# Patient Record
Sex: MALE | Race: WHITE | URBAN - METROPOLITAN AREA
[De-identification: names, ages, dates, MRNs, and addresses within clinical notes are randomized per-mention and may not be internally consistent; named-entity substitution may affect disease eponyms.]

---

## 2023-01-01 ENCOUNTER — LAB REQUISITION (OUTPATIENT)
Dept: LAB | Facility: CLINIC | Age: 88
End: 2023-01-01
Payer: COMMERCIAL

## 2023-01-01 ENCOUNTER — TRANSITIONAL CARE UNIT VISIT (OUTPATIENT)
Dept: GERIATRICS | Facility: CLINIC | Age: 88
End: 2023-01-01
Payer: COMMERCIAL

## 2023-01-01 ENCOUNTER — TELEPHONE (OUTPATIENT)
Dept: GERIATRICS | Facility: CLINIC | Age: 88
End: 2023-01-01

## 2023-01-01 ENCOUNTER — DISCHARGE SUMMARY NURSING HOME (OUTPATIENT)
Dept: GERIATRICS | Facility: CLINIC | Age: 88
End: 2023-01-01
Payer: COMMERCIAL

## 2023-01-01 ENCOUNTER — DOCUMENTATION ONLY (OUTPATIENT)
Dept: GERIATRICS | Facility: CLINIC | Age: 88
End: 2023-01-01

## 2023-01-01 VITALS
HEIGHT: 70 IN | OXYGEN SATURATION: 98 % | RESPIRATION RATE: 17 BRPM | TEMPERATURE: 98.3 F | BODY MASS INDEX: 17.67 KG/M2 | WEIGHT: 123.4 LBS | DIASTOLIC BLOOD PRESSURE: 59 MMHG | HEART RATE: 61 BPM | SYSTOLIC BLOOD PRESSURE: 99 MMHG

## 2023-01-01 VITALS
BODY MASS INDEX: 17.67 KG/M2 | OXYGEN SATURATION: 98 % | HEART RATE: 85 BPM | WEIGHT: 123.4 LBS | HEIGHT: 70 IN | DIASTOLIC BLOOD PRESSURE: 57 MMHG | SYSTOLIC BLOOD PRESSURE: 109 MMHG | TEMPERATURE: 98.2 F | RESPIRATION RATE: 18 BRPM

## 2023-01-01 VITALS
WEIGHT: 120.8 LBS | BODY MASS INDEX: 17.29 KG/M2 | DIASTOLIC BLOOD PRESSURE: 67 MMHG | HEART RATE: 88 BPM | TEMPERATURE: 98.1 F | HEIGHT: 70 IN | OXYGEN SATURATION: 94 % | SYSTOLIC BLOOD PRESSURE: 138 MMHG | RESPIRATION RATE: 18 BRPM

## 2023-01-01 VITALS
HEIGHT: 70 IN | SYSTOLIC BLOOD PRESSURE: 123 MMHG | BODY MASS INDEX: 18.44 KG/M2 | TEMPERATURE: 98 F | DIASTOLIC BLOOD PRESSURE: 61 MMHG | OXYGEN SATURATION: 96 % | RESPIRATION RATE: 16 BRPM | WEIGHT: 128.8 LBS | HEART RATE: 85 BPM

## 2023-01-01 DIAGNOSIS — R52 PAIN: ICD-10-CM

## 2023-01-01 DIAGNOSIS — D62 ABLA (ACUTE BLOOD LOSS ANEMIA): ICD-10-CM

## 2023-01-01 DIAGNOSIS — S06.5XAA SDH (SUBDURAL HEMATOMA) (H): Primary | ICD-10-CM

## 2023-01-01 DIAGNOSIS — S70.02XS HEMATOMA OF LEFT HIP, SEQUELA: ICD-10-CM

## 2023-01-01 DIAGNOSIS — N18.31 STAGE 3A CHRONIC KIDNEY DISEASE (H): ICD-10-CM

## 2023-01-01 DIAGNOSIS — R52 PAIN: Primary | ICD-10-CM

## 2023-01-01 DIAGNOSIS — I48.92 PAROXYSMAL ATRIAL FLUTTER (H): ICD-10-CM

## 2023-01-01 DIAGNOSIS — J44.9 COPD MIXED TYPE (H): ICD-10-CM

## 2023-01-01 DIAGNOSIS — N17.9 AKI (ACUTE KIDNEY INJURY) (H): ICD-10-CM

## 2023-01-01 DIAGNOSIS — I73.9 PAD (PERIPHERAL ARTERY DISEASE) (H): ICD-10-CM

## 2023-01-01 DIAGNOSIS — R91.8 LUNG MASS: ICD-10-CM

## 2023-01-01 DIAGNOSIS — S70.02XD CONTUSION OF LEFT HIP, SUBSEQUENT ENCOUNTER: ICD-10-CM

## 2023-01-01 DIAGNOSIS — S22.31XD FRACTURE OF ONE RIB, RIGHT SIDE, SUBSEQUENT ENCOUNTER FOR FRACTURE WITH ROUTINE HEALING: ICD-10-CM

## 2023-01-01 DIAGNOSIS — S06.5XAD TRAUMATIC SUBDURAL HEMORRHAGE WITH LOSS OF CONSCIOUSNESS STATUS UNKNOWN, SUBSEQUENT ENCOUNTER: ICD-10-CM

## 2023-01-01 DIAGNOSIS — S22.31XS CLOSED FRACTURE OF ONE RIB OF RIGHT SIDE, SEQUELA: ICD-10-CM

## 2023-01-01 LAB
ANION GAP SERPL CALCULATED.3IONS-SCNC: 10 MMOL/L (ref 7–15)
ANION GAP SERPL CALCULATED.3IONS-SCNC: 10 MMOL/L (ref 7–15)
BUN SERPL-MCNC: 11.8 MG/DL (ref 8–23)
BUN SERPL-MCNC: 17.5 MG/DL (ref 8–23)
CALCIUM SERPL-MCNC: 8.5 MG/DL (ref 8.2–9.6)
CALCIUM SERPL-MCNC: 8.5 MG/DL (ref 8.2–9.6)
CHLORIDE SERPL-SCNC: 105 MMOL/L (ref 98–107)
CHLORIDE SERPL-SCNC: 108 MMOL/L (ref 98–107)
CREAT SERPL-MCNC: 1.09 MG/DL (ref 0.67–1.17)
CREAT SERPL-MCNC: 1.13 MG/DL (ref 0.67–1.17)
DEPRECATED HCO3 PLAS-SCNC: 21 MMOL/L (ref 22–29)
DEPRECATED HCO3 PLAS-SCNC: 22 MMOL/L (ref 22–29)
EGFRCR SERPLBLD CKD-EPI 2021: 59 ML/MIN/1.73M2
EGFRCR SERPLBLD CKD-EPI 2021: 62 ML/MIN/1.73M2
ERYTHROCYTE [DISTWIDTH] IN BLOOD BY AUTOMATED COUNT: 23.1 % (ref 10–15)
GLUCOSE SERPL-MCNC: 71 MG/DL (ref 70–99)
GLUCOSE SERPL-MCNC: 75 MG/DL (ref 70–99)
HCT VFR BLD AUTO: 27.1 % (ref 40–53)
HGB BLD-MCNC: 8.7 G/DL (ref 13.3–17.7)
HGB BLD-MCNC: 8.8 G/DL (ref 13.3–17.7)
HGB BLD-MCNC: 9.8 G/DL (ref 13.3–17.7)
MCH RBC QN AUTO: 31.8 PG (ref 26.5–33)
MCHC RBC AUTO-ENTMCNC: 32.5 G/DL (ref 31.5–36.5)
MCV RBC AUTO: 98 FL (ref 78–100)
PLATELET # BLD AUTO: 289 10E3/UL (ref 150–450)
POTASSIUM SERPL-SCNC: 4 MMOL/L (ref 3.4–5.3)
POTASSIUM SERPL-SCNC: 4.4 MMOL/L (ref 3.4–5.3)
RBC # BLD AUTO: 2.77 10E6/UL (ref 4.4–5.9)
SODIUM SERPL-SCNC: 136 MMOL/L (ref 135–145)
SODIUM SERPL-SCNC: 140 MMOL/L (ref 135–145)
WBC # BLD AUTO: 6.6 10E3/UL (ref 4–11)

## 2023-01-01 PROCEDURE — P9604 ONE-WAY ALLOW PRORATED TRIP: HCPCS | Mod: ORL

## 2023-01-01 PROCEDURE — P9604 ONE-WAY ALLOW PRORATED TRIP: HCPCS | Mod: ORL | Performed by: FAMILY MEDICINE

## 2023-01-01 PROCEDURE — 99309 SBSQ NF CARE MODERATE MDM 30: CPT | Performed by: NURSE PRACTITIONER

## 2023-01-01 PROCEDURE — 80048 BASIC METABOLIC PNL TOTAL CA: CPT | Mod: ORL

## 2023-01-01 PROCEDURE — 36415 COLL VENOUS BLD VENIPUNCTURE: CPT | Mod: ORL | Performed by: FAMILY MEDICINE

## 2023-01-01 PROCEDURE — P9603 ONE-WAY ALLOW PRORATED MILES: HCPCS | Mod: ORL | Performed by: FAMILY MEDICINE

## 2023-01-01 PROCEDURE — 36415 COLL VENOUS BLD VENIPUNCTURE: CPT | Mod: ORL

## 2023-01-01 PROCEDURE — 85018 HEMOGLOBIN: CPT | Mod: ORL | Performed by: FAMILY MEDICINE

## 2023-01-01 PROCEDURE — 85027 COMPLETE CBC AUTOMATED: CPT | Mod: ORL

## 2023-01-01 PROCEDURE — 99315 NF DSCHRG MGMT 30 MIN/LESS: CPT | Performed by: NURSE PRACTITIONER

## 2023-01-01 PROCEDURE — 80048 BASIC METABOLIC PNL TOTAL CA: CPT | Mod: ORL | Performed by: FAMILY MEDICINE

## 2023-01-01 RX ORDER — HYDROMORPHONE HYDROCHLORIDE 2 MG/1
0.5 TABLET ORAL EVERY 6 HOURS PRN
COMMUNITY
End: 2023-01-01

## 2023-01-01 RX ORDER — METOPROLOL TARTRATE 25 MG/1
6.25 TABLET, FILM COATED ORAL 2 TIMES DAILY
COMMUNITY

## 2023-01-01 RX ORDER — LIDOCAINE 40 MG/G
CREAM TOPICAL 2 TIMES DAILY
COMMUNITY

## 2023-01-01 RX ORDER — SENNOSIDES 8.6 MG
1 TABLET ORAL 2 TIMES DAILY PRN
COMMUNITY

## 2023-01-01 RX ORDER — POLYETHYLENE GLYCOL 3350 17 G/17G
17 POWDER, FOR SOLUTION ORAL DAILY
COMMUNITY

## 2023-01-01 RX ORDER — ACETAMINOPHEN 500 MG
1000 TABLET ORAL 3 TIMES DAILY
COMMUNITY

## 2023-01-01 RX ORDER — HYDROMORPHONE HYDROCHLORIDE 2 MG/1
1 TABLET ORAL EVERY 6 HOURS PRN
Qty: 30 TABLET | Refills: 0 | Status: SHIPPED | OUTPATIENT
Start: 2023-01-01 | End: 2023-01-01

## 2023-11-03 NOTE — TELEPHONE ENCOUNTER
[facility] Geriatrics Lab Note     Provider: Lobito Jama NP   Facility: [facility]  Facility Type:  TCU    Not on File    Labs Reviewed by provider: BMP, Hgb     Verbal Order/Direction given by Provider: Check Hgb on 11/6/23.      Provider giving Order:  SONAL Saini, COURT    Verbal Order given to: Fran([phone])    Rodriguez Ontiveros RN

## 2023-11-05 PROBLEM — D68.9 COAGULOPATHY (H): Status: ACTIVE | Noted: 2023-01-01

## 2023-11-05 PROBLEM — S70.02XA HIP HEMATOMA, LEFT: Status: ACTIVE | Noted: 2023-01-01

## 2023-11-05 PROBLEM — R06.09 DYSPNEA ON EXERTION: Status: ACTIVE | Noted: 2021-01-19

## 2023-11-05 PROBLEM — I48.92 PAROXYSMAL ATRIAL FLUTTER (H): Status: ACTIVE | Noted: 2023-01-01

## 2023-11-05 PROBLEM — K92.1 MELENA: Status: ACTIVE | Noted: 2022-05-18

## 2023-11-05 PROBLEM — R91.8 LUNG MASS: Status: ACTIVE | Noted: 2023-01-01
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
## 2023-11-05 PROBLEM — F10.20 ALCOHOLISM (H): Status: ACTIVE | Noted: 2021-01-28

## 2023-11-05 PROBLEM — S06.5XAA SDH (SUBDURAL HEMATOMA) (H): Status: ACTIVE | Noted: 2023-01-01

## 2023-11-05 PROBLEM — Z79.01 ANTICOAGULATION MONITORING, INR RANGE 2-3: Status: ACTIVE | Noted: 2021-01-27

## 2023-11-05 PROBLEM — H35.372 EPIRETINAL MEMBRANE (ERM) OF LEFT EYE: Chronic | Status: ACTIVE | Noted: 2017-07-05

## 2023-11-05 PROBLEM — Z99.81 SUPPLEMENTAL OXYGEN DEPENDENT: Status: ACTIVE | Noted: 2021-02-10

## 2023-11-05 PROBLEM — J44.9 COPD MIXED TYPE (H): Status: ACTIVE | Noted: 2021-02-24

## 2023-11-05 PROBLEM — I73.9 PAD (PERIPHERAL ARTERY DISEASE) (H): Status: ACTIVE | Noted: 2019-05-28

## 2023-11-05 PROBLEM — C61 MALIGNANT NEOPLASM OF PROSTATE (H): Status: ACTIVE | Noted: 2021-01-28

## 2023-11-05 PROBLEM — J84.112 USUAL INTERSTITIAL PNEUMONITIS (H): Status: ACTIVE | Noted: 2021-03-17

## 2023-11-05 PROBLEM — Z87.891 HISTORY OF TOBACCO USE: Status: ACTIVE | Noted: 2021-02-03

## 2023-11-05 PROBLEM — R09.02 HYPOXIA: Status: ACTIVE | Noted: 2021-02-03

## 2023-11-05 PROBLEM — D62 ACUTE BLOOD LOSS ANEMIA: Status: ACTIVE | Noted: 2023-01-01

## 2023-11-05 PROBLEM — E87.5 HYPERKALEMIA: Status: ACTIVE | Noted: 2023-01-01

## 2023-11-05 PROBLEM — N17.9 AKI (ACUTE KIDNEY INJURY) (H): Status: ACTIVE | Noted: 2023-01-01

## 2023-11-05 PROBLEM — D72.819 CHRONIC LEUKOPENIA: Status: ACTIVE | Noted: 2021-01-24

## 2023-11-05 PROBLEM — J96.11 CHRONIC RESPIRATORY FAILURE WITH HYPOXIA (H): Status: ACTIVE | Noted: 2022-05-18

## 2023-11-05 PROBLEM — N18.30 CKD (CHRONIC KIDNEY DISEASE) STAGE 3, GFR 30-59 ML/MIN (H): Status: ACTIVE | Noted: 2020-02-03

## 2023-11-05 PROBLEM — I48.92 ATRIAL FLUTTER, CHRONIC (H): Status: ACTIVE | Noted: 2021-01-27

## 2023-11-05 PROBLEM — R42 DIZZINESS: Status: ACTIVE | Noted: 2019-07-29

## 2023-11-05 PROBLEM — Z95.0 CARDIAC PACEMAKER IN SITU: Status: ACTIVE | Noted: 2022-05-18

## 2023-11-05 PROBLEM — S22.31XA FRACTURE OF ONE RIB OF RIGHT SIDE: Status: ACTIVE | Noted: 2022-10-21

## 2023-11-05 PROBLEM — I49.5 TACHY-BRADY SYNDROME (H): Status: ACTIVE | Noted: 2023-01-01

## 2023-11-05 PROBLEM — S22.080A T12 COMPRESSION FRACTURE (H): Status: ACTIVE | Noted: 2022-10-21

## 2023-11-05 NOTE — PROGRESS NOTES
[facility] GERIATRICS    PRIMARY CARE PROVIDER AND CLINIC:  No primary care provider on file., No primary physician on file.  Chief Complaint   Patient presents with    ELIEECK      [facility] Medical Record Number:  [id number]  Place of Service where encounter took place:  [facility] ([facility]) [[id number]]    Graeme Ogden  is a 89 year old  (12/14/1925), admitted to the above facility from  [facility]. Hospital stay 10/27/23 through 11/1/23..     HPI:    This is a 89 yo male with PMHx for PAF on coumadin, PPM, CKF stage 3a, COPD, Htn who presented after mechanical fall and found to have INR >20 with SDH. Of note lives in senior living and has been falling more.  Hypercoagulability reversed with vitamin K and Kcentra, neurosurgery consulted, no indication for intervention, recommending holding anticoagulation for at least 1 month preferably indefinitely, will follow-up with cardiologist.  Did have episode of A-fib with RVR, was asymptomatic started on low-dose Lopressor for rate control.  Was given Tylenol and Dilaudid for pain control as he had significant hematoma on the left side of chest.  No other changes noted, discharged to [facility] for rehab.    CODE STATUS/ADVANCE DIRECTIVES DISCUSSION:  No Order  DNR / DNI  ALLERGIES:   Allergies   Allergen Reactions    Mirtazapine Other (See Comments)      PAST MEDICAL HISTORY: No past medical history on file.   PAST SURGICAL HISTORY:   has no past surgical history on file.  FAMILY HISTORY: family history is not on file.  SOCIAL HISTORY:     Patient's living condition: lives with spouse    Post Discharge Medication Reconciliation Status:   MED REC REQUIRED  Post Medication Reconciliation Status:  Discharge medications reconciled and changed, see notes/orders       Current Outpatient Medications   Medication Sig    acetaminophen (TYLENOL) 500 MG tablet Take 1,000 mg by mouth 4 times daily    lidocaine (LMX4) 4 % external cream Apply topically 2  "times daily    metoprolol tartrate (LOPRESSOR) 25 MG tablet Take 6.25 mg by mouth 2 times daily    polyethylene glycol (MIRALAX) 17 GM/Dose powder Take 17 g by mouth daily    sennosides (SENOKOT) 8.6 MG tablet Take 1 tablet by mouth 2 times daily as needed for constipation     No current facility-administered medications for this visit.       ROS:  10 point ROS of systems including Constitutional, Eyes, Respiratory, Cardiovascular, Gastroenterology, Genitourinary, Integumentary, Musculoskeletal, Psychiatric were all negative except for pertinent positives noted in my HPI.    Vitals:  /61   Pulse 85   Temp 98  F (36.7  C)   Resp 16   Ht 1.778 m (5' 10\")   Wt 58.4 kg (128 lb 12.8 oz)   SpO2 96%   BMI 18.48 kg/m    Exam:  GENERAL APPEARANCE:  Alert, in no distress, thin, cooperative, L side of chest pain  ENT:  Mouth and posterior oropharynx normal, moist mucous membranes, normal hearing acuity  NECK:  No adenopathy,masses or thyromegaly  RESP:  lungs clear to auscultation , no respiratory distress  CV:  no edema, irregular rhythm per rate controlled PAF  ABDOMEN:  normal bowel sounds, soft, nontender, no hepatosplenomegaly or other masses, has constipation  M/S:   Able to move all extremities  SKIN:  significant bruise on L side of chest  NEURO:   No focal deficits  PSYCH:  oriented to 2/3    Lab/Diagnostic data:  Hgb 8.1  Cr 1.22  GFR 54  K 3.8    ASSESSMENT/PLAN:    (S06.5XAA) SDH (subdural hematoma) (H)    Left side of chest hematoma  No surgical intervention per NSGY, hypercoagulation corrected and AC discontinued for at least 1 month, will follow-up with cardiology per duration    (R52) Pain  Today increase Tylenol to 1000 mg 4 times daily, discontinue Dilaudid, continue lidocaine patch    (I73.9) PAD (peripheral artery disease) (H24)  (I48.92) Paroxysmal atrial flutter (H)  AC discontinued, continued on metoprolol tartrate 6.25 mg twice daily, monitor blood pressure    (J44.9) COPD mixed type " (H)  (R91.8) Lung mass  Currently room air, not on maintenance medication    (N17.9) RIKI (acute kidney injury)   Last creatinine 1.22 with GFR 54, recheck BMP on 11/8    (D62) ABLA (acute blood loss anemia)  Last Hgb 8.1, recheck CBC on 11/8    Constipation  Start MiraLAX daily and continue senna 1 tab twice daily as needed    Orders:  Monitor BP, increase tylenol, discontinue dilaudid, BMP/CBC recheck    Electronically signed by:  Lobito Jama NP

## 2023-11-06 PROBLEM — R52 PAIN: Status: ACTIVE | Noted: 2023-01-01

## 2023-11-06 NOTE — LETTER
11/6/2023        RE: Graeme Ogden  [address]  [place] [address]        [facility] GERIATRICS    PRIMARY CARE PROVIDER AND CLINIC:  No primary care provider on file., No primary physician on file.  Chief Complaint   Patient presents with     RECHECK      [facility] Medical Record Number:  [id number]  Place of Service where encounter took place:  [facility] ([facility]) [[id number]]    Graeme Ogden  is a 89 year old  (12/14/1925), admitted to the above facility from  [facility]. Hospital stay 10/27/23 through 11/1/23..     HPI:    This is a 89 yo male with PMHx for PAF on coumadin, PPM, CKF stage 3a, COPD, Htn who presented after mechanical fall and found to have INR >20 with SDH. Of note lives in senior living and has been falling more.  Hypercoagulability reversed with vitamin K and Kcentra, neurosurgery consulted, no indication for intervention, recommending holding anticoagulation for at least 1 month preferably indefinitely, will follow-up with cardiologist.  Did have episode of A-fib with RVR, was asymptomatic started on low-dose Lopressor for rate control.  Was given Tylenol and Dilaudid for pain control as he had significant hematoma on the left side of chest.  No other changes noted, discharged to [facility] for rehab.    CODE STATUS/ADVANCE DIRECTIVES DISCUSSION:  No Order  DNR / DNI  ALLERGIES:   Allergies   Allergen Reactions     Mirtazapine Other (See Comments)      PAST MEDICAL HISTORY: No past medical history on file.   PAST SURGICAL HISTORY:   has no past surgical history on file.  FAMILY HISTORY: family history is not on file.  SOCIAL HISTORY:     Patient's living condition: lives with spouse    Post Discharge Medication Reconciliation Status:   MED REC REQUIRED  Post Medication Reconciliation Status:  Discharge medications reconciled and changed, see notes/orders       Current Outpatient Medications   Medication Sig     acetaminophen (TYLENOL) 500 MG tablet  "Take 1,000 mg by mouth 4 times daily     lidocaine (LMX4) 4 % external cream Apply topically 2 times daily     metoprolol tartrate (LOPRESSOR) 25 MG tablet Take 6.25 mg by mouth 2 times daily     polyethylene glycol (MIRALAX) 17 GM/Dose powder Take 17 g by mouth daily     sennosides (SENOKOT) 8.6 MG tablet Take 1 tablet by mouth 2 times daily as needed for constipation     No current facility-administered medications for this visit.       ROS:  10 point ROS of systems including Constitutional, Eyes, Respiratory, Cardiovascular, Gastroenterology, Genitourinary, Integumentary, Musculoskeletal, Psychiatric were all negative except for pertinent positives noted in my HPI.    Vitals:  /61   Pulse 85   Temp 98  F (36.7  C)   Resp 16   Ht 1.778 m (5' 10\")   Wt 58.4 kg (128 lb 12.8 oz)   SpO2 96%   BMI 18.48 kg/m    Exam:  GENERAL APPEARANCE:  Alert, in no distress, thin, cooperative, L side of chest pain  ENT:  Mouth and posterior oropharynx normal, moist mucous membranes, normal hearing acuity  NECK:  No adenopathy,masses or thyromegaly  RESP:  lungs clear to auscultation , no respiratory distress  CV:  no edema, irregular rhythm per rate controlled PAF  ABDOMEN:  normal bowel sounds, soft, nontender, no hepatosplenomegaly or other masses, has constipation  M/S:   Able to move all extremities  SKIN:  significant bruise on L side of chest  NEURO:   No focal deficits  PSYCH:  oriented to 2/3    Lab/Diagnostic data:  Hgb 8.1  Cr 1.22  GFR 54  K 3.8    ASSESSMENT/PLAN:    (S06.5XAA) SDH (subdural hematoma) (H)    Left side of chest hematoma  No surgical intervention per NSGY, hypercoagulation corrected and AC discontinued for at least 1 month, will follow-up with cardiology per duration    (R52) Pain  Today increase Tylenol to 1000 mg 4 times daily, discontinue Dilaudid, continue lidocaine patch    (I73.9) PAD (peripheral artery disease) (H24)  (I48.92) Paroxysmal atrial flutter (H)  AC discontinued, continued on " metoprolol tartrate 6.25 mg twice daily, monitor blood pressure    (J44.9) COPD mixed type (H)  (R91.8) Lung mass  Currently room air, not on maintenance medication    (N17.9) RIKI (acute kidney injury)   Last creatinine 1.22 with GFR 54, recheck BMP on 11/8    (D62) ABLA (acute blood loss anemia)  Last Hgb 8.1, recheck CBC on 11/8    Constipation  Start MiraLAX daily and continue senna 1 tab twice daily as needed    Orders:  Monitor BP, increase tylenol, discontinue dilaudid, BMP/CBC recheck    Electronically signed by:  Lobito Jama NP                   Sincerely,        Lobito Jama NP

## 2023-11-10 NOTE — LETTER
"    11/10/2023        RE: Graeme Ogden  [address]  [place] [address]        [facility] GERIATRICS    Chief Complaint   Patient presents with     RECHECK     HPI:  Graeme Ogden is a 89 year old  (12/14/1925), who is being seen today for an episodic care visit at: [facility] ([facility]) [[id number]].     This is a 89 yo male with PMHx for PAF on coumadin, PPM, CKF stage 3a, COPD, Htn who presented after mechanical fall and found to have INR >20 with SDH. Of note lives in senior living and has been falling more.  Hypercoagulability reversed with vitamin K and Kcentra, neurosurgery consulted, no indication for intervention, recommending holding anticoagulation for at least 1 month preferably indefinitely, will follow-up with cardiologist.  Did have episode of A-fib with RVR, was asymptomatic started on low-dose Lopressor for rate control.  Was given Tylenol and Dilaudid for pain control as he had significant hematoma on the left side of chest.  No other changes noted, discharged to [facility] for rehab.    Today's concern is:   Pain, hematoma, ABLA    Allergies, and PMH/PSH reviewed in [facility] today.  REVIEW OF SYSTEMS:  4 point ROS including Respiratory, CV, GI and , other than that noted in the HPI,  is negative    Objective:   /57   Pulse 85   Temp 98.2  F (36.8  C)   Resp 18   Ht 1.778 m (5' 10\")   Wt 56 kg (123 lb 6.4 oz)   SpO2 98%   BMI 17.71 kg/m    GENERAL APPEARANCE:  Alert, in no distress, thin, cooperative, pain controlled  RESP:  lungs clear to auscultation , no respiratory distress, RA  CV:  no edema, irregular rhythm per rate controlled PAF  PSYCH:  oriented to 2/3    Most Recent 3 CBC's:  Recent Labs   Lab Test 11/08/23  0551 11/06/23  1155 11/03/23  0625   WBC 6.6  --   --    HGB 8.8* 9.8* 8.7*   MCV 98  --   --      --   --      Most Recent 3 BMP's:  Recent Labs   Lab Test 11/08/23  0551 11/03/23  0625    140   POTASSIUM 4.0 4.4   CHLORIDE " 105 108*   CO2 21* 22   BUN 11.8 17.5   CR 1.09 1.13   ANIONGAP 10 10   MARILEE 8.5 8.5   GLC 75 71       Assessment/Plan:    (S06.5XAA) SDH (subdural hematoma) (H)    Left side of chest hematoma  No surgical intervention per NSGY, hypercoagulation corrected and AC discontinued for at least 1 month, will follow-up with cardiology per duration     (R52) Pain  Using Tylenol 1000 mg 4 times daily, discontinued Dilaudid prior, continue lidocaine patch. Pain controlled, plan to wean next visit     (I73.9) PAD (peripheral artery disease) (H24)  (I48.92) Paroxysmal atrial flutter (H)  AC discontinued, continued on metoprolol tartrate 6.25 mg twice daily, -120s, HR <70s     (J44.9) COPD mixed type (H)  (R91.8) Lung mass  Currently room air, not on maintenance medication     (N17.9) RIKI (acute kidney injury)   Last creatinine 1.09 with GFR >60 on 11/8     (D62) ABLA (acute blood loss anemia)  Last Hgb 8.8 on 11/8     Constipation  Start MiraLAX daily and continue senna 1 tab twice daily as needed    Orders:  NA    Electronically signed by: Lobito Jama NP         Sincerely,        Lobito Jama NP

## 2023-11-10 NOTE — PROGRESS NOTES
"[facility] GERIATRICS    Chief Complaint   Patient presents with    RECHECK     HPI:  Graeme Ogden is a 89 year old  (12/14/1925), who is being seen today for an episodic care visit at: [facility] ([facility]) [[id number]].     This is a 89 yo male with PMHx for PAF on coumadin, PPM, CKF stage 3a, COPD, Htn who presented after mechanical fall and found to have INR >20 with SDH. Of note lives in senior living and has been falling more.  Hypercoagulability reversed with vitamin K and Kcentra, neurosurgery consulted, no indication for intervention, recommending holding anticoagulation for at least 1 month preferably indefinitely, will follow-up with cardiologist.  Did have episode of A-fib with RVR, was asymptomatic started on low-dose Lopressor for rate control.  Was given Tylenol and Dilaudid for pain control as he had significant hematoma on the left side of chest.  No other changes noted, discharged to [facility] for rehab.    Today's concern is:   Pain, hematoma, ABLA    Allergies, and PMH/PSH reviewed in [facility] today.  REVIEW OF SYSTEMS:  4 point ROS including Respiratory, CV, GI and , other than that noted in the HPI,  is negative    Objective:   /57   Pulse 85   Temp 98.2  F (36.8  C)   Resp 18   Ht 1.778 m (5' 10\")   Wt 56 kg (123 lb 6.4 oz)   SpO2 98%   BMI 17.71 kg/m    GENERAL APPEARANCE:  Alert, in no distress, thin, cooperative, pain controlled  RESP:  lungs clear to auscultation , no respiratory distress, RA  CV:  no edema, irregular rhythm per rate controlled PAF  PSYCH:  oriented to 2/3    Most Recent 3 CBC's:  Recent Labs   Lab Test 11/08/23  0551 11/06/23  1155 11/03/23  0625   WBC 6.6  --   --    HGB 8.8* 9.8* 8.7*   MCV 98  --   --      --   --      Most Recent 3 BMP's:  Recent Labs   Lab Test 11/08/23  0551 11/03/23  0625    140   POTASSIUM 4.0 4.4   CHLORIDE 105 108*   CO2 21* 22   BUN 11.8 17.5   CR 1.09 1.13   ANIONGAP 10 10   MARILEE 8.5 8.5   GLC 75 71 "       Assessment/Plan:    (S06.5XAA) SDH (subdural hematoma) (H)    Left side of chest hematoma  No surgical intervention per NSGY, hypercoagulation corrected and AC discontinued for at least 1 month, will follow-up with cardiology per duration     (R52) Pain  Using Tylenol 1000 mg 4 times daily, discontinued Dilaudid prior, continue lidocaine patch. Pain controlled, plan to wean next visit     (I73.9) PAD (peripheral artery disease) (H24)  (I48.92) Paroxysmal atrial flutter (H)  AC discontinued, continued on metoprolol tartrate 6.25 mg twice daily, -120s, HR <70s     (J44.9) COPD mixed type (H)  (R91.8) Lung mass  Currently room air, not on maintenance medication     (N17.9) RIKI (acute kidney injury)   Last creatinine 1.09 with GFR >60 on 11/8     (D62) ABLA (acute blood loss anemia)  Last Hgb 8.8 on 11/8     Constipation  Start MiraLAX daily and continue senna 1 tab twice daily as needed    Orders:  NA    Electronically signed by: Lobito Jama NP

## 2023-11-13 PROBLEM — Z99.81 SUPPLEMENTAL OXYGEN DEPENDENT: Status: RESOLVED | Noted: 2021-02-10 | Resolved: 2023-01-01

## 2023-11-13 NOTE — PROGRESS NOTES
[facility] GERIATRICS    Chief Complaint   Patient presents with    RECHECK     HPI:  Graeme Ogden is a 89 year old  (12/14/1925), who is being seen today for an episodic care visit at: [facility] ([facility]) [[id number]].     This is a 89 yo male with PMHx for PAF on coumadin, PPM, CKF stage 3a, COPD, Htn who presented after mechanical fall and found to have INR >20 with SDH. Of note lives in senior living and has been falling more.  Hypercoagulability reversed with vitamin K and Kcentra, neurosurgery consulted, no indication for intervention, recommending holding anticoagulation for at least 1 month preferably indefinitely, will follow-up with cardiologist.  Did have episode of A-fib with RVR, was asymptomatic started on low-dose Lopressor for rate control.  Was given Tylenol and Dilaudid for pain control as he had significant hematoma on the left side of chest.  No other changes noted, discharged to [facility] for rehab.     Today's concern is:   Pain, ABLA    Allergies, and PMH/PSH reviewed in EPIC today.  REVIEW OF SYSTEMS:  4 point ROS including Respiratory, CV, GI and , other than that noted in the HPI,  is negative    Objective:   GENERAL APPEARANCE:  Alert, in no distress, thin, cooperative, pain controlled  RESP:  lungs clear to auscultation , no respiratory distress, RA  CV:  no edema, irregular rhythm per rate controlled PAF  PSYCH:  oriented to 2/3    Labs  See below    Assessment/Plan:    (S06.5XAA) SDH (subdural hematoma) (H)    Left side of chest hematoma  No surgical intervention per NSGY, hypercoagulation corrected and AC discontinued for at least 1 month, will follow-up with cardiology per duration, indefinitely?     (R52) Pain  Today change Tylenol to 1000 mg 3 times daily with 1000mg daily PRN, discontinued Dilaudid prior, continue lidocaine patch. Pain controlled     (I73.9) PAD (peripheral artery disease) (H24)  (I48.92) Paroxysmal atrial flutter (H)  AC discontinued,  continued on metoprolol tartrate 6.25 mg twice daily, -120s, HR <70s. Stable     (J44.9) COPD mixed type (H)  (R91.8) Lung mass  Currently room air, not on maintenance medication     (N17.9) RIKI (acute kidney injury)   Last creatinine 1.09 with GFR >60 on 11/8     (D62) ABLA (acute blood loss anemia)  Last Hgb 8.8 on 11/8, improving     Constipation  Using MiraLAX daily and continue senna 1 tab twice daily as needed    Orders:  Decrease tylenol    Electronically signed by: Lobito Jama NP

## 2023-11-13 NOTE — LETTER
11/13/2023        RE: Graeme Ogden  [address]  [place] [address]        [facility] GERIATRICS    Chief Complaint   Patient presents with     RECHECK     HPI:  Graeme Ogden is a 89 year old  (12/14/1925), who is being seen today for an episodic care visit at: [facility] ([facility]) [[id number]].     This is a 89 yo male with PMHx for PAF on coumadin, PPM, CKF stage 3a, COPD, Htn who presented after mechanical fall and found to have INR >20 with SDH. Of note lives in senior living and has been falling more.  Hypercoagulability reversed with vitamin K and Kcentra, neurosurgery consulted, no indication for intervention, recommending holding anticoagulation for at least 1 month preferably indefinitely, will follow-up with cardiologist.  Did have episode of A-fib with RVR, was asymptomatic started on low-dose Lopressor for rate control.  Was given Tylenol and Dilaudid for pain control as he had significant hematoma on the left side of chest.  No other changes noted, discharged to [facility] for rehab.     Today's concern is:   Pain, ABLA    Allergies, and PMH/PSH reviewed in EPIC today.  REVIEW OF SYSTEMS:  4 point ROS including Respiratory, CV, GI and , other than that noted in the HPI,  is negative    Objective:   GENERAL APPEARANCE:  Alert, in no distress, thin, cooperative, pain controlled  RESP:  lungs clear to auscultation , no respiratory distress, RA  CV:  no edema, irregular rhythm per rate controlled PAF  PSYCH:  oriented to 2/3    Labs  See below    Assessment/Plan:    (S06.5XAA) SDH (subdural hematoma) (H)    Left side of chest hematoma  No surgical intervention per NSGY, hypercoagulation corrected and AC discontinued for at least 1 month, will follow-up with cardiology per duration, indefinitely?     (R52) Pain  Today change Tylenol to 1000 mg 3 times daily with 1000mg daily PRN, discontinued Dilaudid prior, continue lidocaine patch. Pain controlled     (I73.9) PAD  (peripheral artery disease) (H24)  (I48.92) Paroxysmal atrial flutter (H)  AC discontinued, continued on metoprolol tartrate 6.25 mg twice daily, -120s, HR <70s. Stable     (J44.9) COPD mixed type (H)  (R91.8) Lung mass  Currently room air, not on maintenance medication     (N17.9) RIKI (acute kidney injury)   Last creatinine 1.09 with GFR >60 on 11/8     (D62) ABLA (acute blood loss anemia)  Last Hgb 8.8 on 11/8, improving     Constipation  Using MiraLAX daily and continue senna 1 tab twice daily as needed    Orders:  Decrease tylenol    Electronically signed by: Lobito Jama NP         Sincerely,        Lobito Jama NP

## 2023-11-16 NOTE — PROGRESS NOTES
[facility] GERIATRICS DISCHARGE SUMMARY  PATIENT'S NAME: Graeme Ogden  YOB: 1925  MEDICAL RECORD NUMBER:  [id number]  Place of Service where encounter took place:  [facility] ([facility]) [[id number]]    PRIMARY CARE PROVIDER AND CLINIC RESPONSIBLE AFTER TRANSFER:   Physician No Ref-Primary, No address on file    FMG Provider     Transferring providers: LAKIA PRUITT CNP; ORTIZ HYLTON MD   Recent Hospitalization/ED:  [facility] stay 10/27/23 to 11/01/23.  Date of SNF Admission: November 01, 2023  Date of SNF (anticipated) Discharge: November 17, 2023  Discharged to: previous independent home  Cognitive Scores:  not completed  Physical Function: Ambulating >100 ft with FWW  DME: No new DME needed    CODE STATUS/ADVANCE DIRECTIVES DISCUSSION:  DNR   ALLERGIES: Mirtazapine    HPI  This is a 89 yo male with PMHx for PAF on coumadin, PPM, CKF stage 3a, COPD, Htn who presented after mechanical fall and found to have INR >20 with SDH. Of note lives in senior living and has been falling more.  Hypercoagulability reversed with vitamin K and Kcentra, neurosurgery consulted, no indication for intervention, recommending holding anticoagulation for at least 1 month preferably indefinitely, will follow-up with cardiologist.  Did have episode of A-fib with RVR, was asymptomatic started on low-dose Lopressor for rate control.  Was given Tylenol and Dilaudid for pain control as he had significant hematoma on the left side of chest.  No other changes noted, discharged to [facility] for rehab.     Summary of nursing facility stay:     (S06.5XAA) SDH (subdural hematoma) (H)    Left side of chest hematoma  No surgical intervention per NSGY, hypercoagulation corrected and AC discontinued for at least 1 month, will follow-up with cardiology per duration, indefinitely?     (R52) Pain  Using Tylenol 1000 mg 3 times daily with 1000mg daily PRN with lidocaine patch     (I73.9) PAD  "(peripheral artery disease) (H24)  (I48.92) Paroxysmal atrial flutter (H)  AC discontinued, continued on metoprolol tartrate 6.25 mg twice daily, -120s, HR <70s     (J44.9) COPD mixed type (H)  (R91.8) Lung mass  Currently room air, not on maintenance medication     (N17.9) RIKI (acute kidney injury)   Last creatinine 1.09 with GFR >60 on 11/8     (D62) ABLA (acute blood loss anemia)  Last Hgb 8.8 on 11/8, improving     Constipation  Using MiraLAX daily and continue senna 1 tab twice daily as needed    Discharge Medications:    Current Outpatient Medications   Medication Sig Dispense Refill    acetaminophen (TYLENOL) 500 MG tablet Take 1,000 mg by mouth 3 times daily And 1000mg daily PRN      lidocaine (LMX4) 4 % external cream Apply topically 2 times daily      metoprolol tartrate (LOPRESSOR) 25 MG tablet Take 6.25 mg by mouth 2 times daily      polyethylene glycol (MIRALAX) 17 GM/Dose powder Take 17 g by mouth daily      sennosides (SENOKOT) 8.6 MG tablet Take 1 tablet by mouth 2 times daily as needed for constipation         Controlled medications:   not applicable/none     Past Medical History: No past medical history on file.  Physical Exam:   Vitals: /67   Pulse 88   Temp 98.1  F (36.7  C)   Resp 18   Ht 1.778 m (5' 10\")   Wt 54.8 kg (120 lb 12.8 oz)   SpO2 94%   BMI 17.33 kg/m    BMI: Body mass index is 17.33 kg/m .  GENERAL APPEARANCE:  Alert, in no distress, thin, cooperative, pain controlled  RESP:  lungs clear to auscultation , no respiratory distress, RA  CV:  no edema, irregular rhythm per rate controlled PAF  PSYCH:  oriented to 2/3    SNF labs: Most Recent 3 CBC's:  Recent Labs   Lab Test 11/08/23  0551 11/06/23  1155 11/03/23  0625   WBC 6.6  --   --    HGB 8.8* 9.8* 8.7*   MCV 98  --   --      --   --      Most Recent 3 BMP's:  Recent Labs   Lab Test 11/08/23  0551 11/03/23  0625    140   POTASSIUM 4.0 4.4   CHLORIDE 105 108*   CO2 21* 22   BUN 11.8 17.5   CR 1.09 " 1.13   [facility] 10 10   MARILEE 8.5 8.5   GLC 75 71       DISCHARGE PLAN:  Follow up labs: No labs orders/due  Medical Follow Up:      Follow up with primary care provider in 1-2 weeks  [facility] scheduled appointments:     Discharge Services: Home Care:  Occupational Therapy and Physical Therapy  Discharge Instructions Verbalized to Patient at Discharge:   None    TOTAL DISCHARGE TIME:   Less than or equal to 30 minutes  Electronically signed by:  Lobito Jama NP     Documentation of Face to Face and Certification for Home Health Services    I certify that patient: Graeme Ogden is under my care and that I, or a nurse practitioner or physician's assistant working with me, had a face-to-face encounter that meets the physician face-to-face encounter requirements with this patient on: 11/17/2023.    This encounter with the patient was in whole, or in part, for the following medical condition, which is the primary reason for home health care: therapy.    I certify that, based on my findings, the following services are medically necessary home health services: Occupational Therapy and Physical Therapy.    My clinical findings support the need for the above services because: Occupational Therapy Services are needed to assess and treat cognitive ability and address ADL safety due to impairment in cognition. and Physical Therapy Services are needed to assess and treat the following functional impairments: falls risk.    Further, I certify that my clinical findings support that this patient is homebound (i.e. absences from home require considerable and taxing effort and are for medical reasons or [employer] services or infrequently or of short duration when for other reasons) because: Requires assistance of another person or specialized equipment to access medical services because patient:  returning home..    Based on the above findings. I certify that this patient is confined to the home and needs intermittent  skilled nursing care, physical therapy and/or speech therapy.  The patient is under my care, and I have initiated the establishment of the plan of care.  This patient will be followed by a physician who will periodically review the plan of care.  Physician/Provider to provide follow up care: No Ref-Primary, Physician    Attending hospital physician (the Medicare certified PEC[facility] provider): Lobito Jama NP  Physician Signature: See electronic signature associated with these discharge orders.  Date: 11/17/2023

## 2023-11-17 NOTE — LETTER
11/17/2023        RE: Graeme Ogden  [address]  [place] [address]        [facility] GERIATRICS DISCHARGE SUMMARY  PATIENT'S NAME: Graeme Ogden  YOB: 1925  MEDICAL RECORD NUMBER:  [id number]  Place of Service where encounter took place:  [facility] ([facility]) [[id number]]    PRIMARY CARE PROVIDER AND CLINIC RESPONSIBLE AFTER TRANSFER:   Physician No Ref-Primary, No address on file    G Provider     Transferring providers: LAKIA PRUITT CNP; ORTIZ HYLTON MD   Recent Hospitalization/ED:  [facility] stay 10/27/23 to 11/01/23.  Date of SNF Admission: November 01, 2023  Date of SNF (anticipated) Discharge: November 17, 2023  Discharged to: previous independent home  Cognitive Scores:  not completed  Physical Function: Ambulating >100 ft with FWW  DME: No new DME needed    CODE STATUS/ADVANCE DIRECTIVES DISCUSSION:  DNR   ALLERGIES: Mirtazapine    HPI  This is a 89 yo male with PMHx for PAF on coumadin, PPM, CKF stage 3a, COPD, Htn who presented after mechanical fall and found to have INR >20 with SDH. Of note lives in senior living and has been falling more.  Hypercoagulability reversed with vitamin K and Kcentra, neurosurgery consulted, no indication for intervention, recommending holding anticoagulation for at least 1 month preferably indefinitely, will follow-up with cardiologist.  Did have episode of A-fib with RVR, was asymptomatic started on low-dose Lopressor for rate control.  Was given Tylenol and Dilaudid for pain control as he had significant hematoma on the left side of chest.  No other changes noted, discharged to [facility] for rehab.     Summary of nursing facility stay:     (S06.5XAA) SDH (subdural hematoma) (H)    Left side of chest hematoma  No surgical intervention per NSGY, hypercoagulation corrected and AC discontinued for at least 1 month, will follow-up with cardiology per duration, indefinitely?     (R52) Pain  Using  "Tylenol 1000 mg 3 times daily with 1000mg daily PRN with lidocaine patch     (I73.9) PAD (peripheral artery disease) (H24)  (I48.92) Paroxysmal atrial flutter (H)  AC discontinued, continued on metoprolol tartrate 6.25 mg twice daily, -120s, HR <70s     (J44.9) COPD mixed type (H)  (R91.8) Lung mass  Currently room air, not on maintenance medication     (N17.9) RIKI (acute kidney injury)   Last creatinine 1.09 with GFR >60 on 11/8     (D62) ABLA (acute blood loss anemia)  Last Hgb 8.8 on 11/8, improving     Constipation  Using MiraLAX daily and continue senna 1 tab twice daily as needed    Discharge Medications:    Current Outpatient Medications   Medication Sig Dispense Refill     acetaminophen (TYLENOL) 500 MG tablet Take 1,000 mg by mouth 3 times daily And 1000mg daily PRN       lidocaine (LMX4) 4 % external cream Apply topically 2 times daily       metoprolol tartrate (LOPRESSOR) 25 MG tablet Take 6.25 mg by mouth 2 times daily       polyethylene glycol (MIRALAX) 17 GM/Dose powder Take 17 g by mouth daily       sennosides (SENOKOT) 8.6 MG tablet Take 1 tablet by mouth 2 times daily as needed for constipation         Controlled medications:   not applicable/none     Past Medical History: No past medical history on file.  Physical Exam:   Vitals: /67   Pulse 88   Temp 98.1  F (36.7  C)   Resp 18   Ht 1.778 m (5' 10\")   Wt 54.8 kg (120 lb 12.8 oz)   SpO2 94%   BMI 17.33 kg/m    BMI: Body mass index is 17.33 kg/m .  GENERAL APPEARANCE:  Alert, in no distress, thin, cooperative, pain controlled  RESP:  lungs clear to auscultation , no respiratory distress, RA  CV:  no edema, irregular rhythm per rate controlled PAF  PSYCH:  oriented to 2/3    SNF labs: Most Recent 3 CBC's:  Recent Labs   Lab Test 11/08/23  0551 11/06/23  1155 11/03/23  0625   WBC 6.6  --   --    HGB 8.8* 9.8* 8.7*   MCV 98  --   --      --   --      Most Recent 3 BMP's:  Recent Labs   Lab Test 11/08/23  0551 11/03/23  0625 "    140   POTASSIUM 4.0 4.4   CHLORIDE 105 108*   CO2 21* 22   BUN 11.8 17.5   CR 1.09 1.13   ANIONGAP 10 10   MARILEE 8.5 8.5   GLC 75 71       DISCHARGE PLAN:  Follow up labs: No labs orders/due  Medical Follow Up:      Follow up with primary care provider in 1-2 weeks  [facility] scheduled appointments:     Discharge Services: Home Care:  Occupational Therapy and Physical Therapy  Discharge Instructions Verbalized to Patient at Discharge:   None    TOTAL DISCHARGE TIME:   Less than or equal to 30 minutes  Electronically signed by:  Lobito Jama NP     Documentation of Face to Face and Certification for Home Health Services    I certify that patient: Graeme Ogden is under my care and that I, or a nurse practitioner or physician's assistant working with me, had a face-to-face encounter that meets the physician face-to-face encounter requirements with this patient on: 11/17/2023.    This encounter with the patient was in whole, or in part, for the following medical condition, which is the primary reason for home health care: therapy.    I certify that, based on my findings, the following services are medically necessary home health services: Occupational Therapy and Physical Therapy.    My clinical findings support the need for the above services because: Occupational Therapy Services are needed to assess and treat cognitive ability and address ADL safety due to impairment in cognition. and Physical Therapy Services are needed to assess and treat the following functional impairments: falls risk.    Further, I certify that my clinical findings support that this patient is homebound (i.e. absences from home require considerable and taxing effort and are for medical reasons or [employer] services or infrequently or of short duration when for other reasons) because: Requires assistance of another person or specialized equipment to access medical services because patient:  returning home..    Based on the  above findings. I certify that this patient is confined to the home and needs intermittent skilled nursing care, physical therapy and/or speech therapy.  The patient is under my care, and I have initiated the establishment of the plan of care.  This patient will be followed by a physician who will periodically review the plan of care.  Physician/Provider to provide follow up care: No Ref-Primary, Physician    Attending hospital physician (the Medicare certified PEC[facility] provider): Lobito Jama NP  Physician Signature: See electronic signature associated with these discharge orders.  Date: 11/17/2023              Sincerely,        Lobito Jama NP